# Patient Record
Sex: FEMALE | Race: OTHER | NOT HISPANIC OR LATINO
[De-identification: names, ages, dates, MRNs, and addresses within clinical notes are randomized per-mention and may not be internally consistent; named-entity substitution may affect disease eponyms.]

---

## 2020-05-09 ENCOUNTER — TRANSCRIPTION ENCOUNTER (OUTPATIENT)
Age: 50
End: 2020-05-09

## 2021-05-20 ENCOUNTER — RESULT REVIEW (OUTPATIENT)
Age: 51
End: 2021-05-20

## 2021-06-01 ENCOUNTER — RESULT REVIEW (OUTPATIENT)
Age: 51
End: 2021-06-01

## 2021-06-03 ENCOUNTER — RESULT REVIEW (OUTPATIENT)
Age: 51
End: 2021-06-03

## 2021-06-27 ENCOUNTER — EMERGENCY (EMERGENCY)
Facility: HOSPITAL | Age: 51
LOS: 0 days | Discharge: HOME | End: 2021-06-27
Attending: EMERGENCY MEDICINE | Admitting: EMERGENCY MEDICINE
Payer: COMMERCIAL

## 2021-06-27 VITALS
DIASTOLIC BLOOD PRESSURE: 66 MMHG | SYSTOLIC BLOOD PRESSURE: 126 MMHG | HEART RATE: 68 BPM | RESPIRATION RATE: 18 BRPM | OXYGEN SATURATION: 100 %

## 2021-06-27 VITALS
OXYGEN SATURATION: 98 % | HEART RATE: 54 BPM | SYSTOLIC BLOOD PRESSURE: 144 MMHG | DIASTOLIC BLOOD PRESSURE: 72 MMHG | WEIGHT: 139.99 LBS | RESPIRATION RATE: 18 BRPM | TEMPERATURE: 97 F | HEIGHT: 64 IN

## 2021-06-27 DIAGNOSIS — R42 DIZZINESS AND GIDDINESS: ICD-10-CM
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
## 2021-06-27 DIAGNOSIS — R19.7 DIARRHEA, UNSPECIFIED: ICD-10-CM
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
## 2021-06-27 DIAGNOSIS — Z85.3 PERSONAL HISTORY OF MALIGNANT NEOPLASM OF BREAST: ICD-10-CM

## 2021-06-27 DIAGNOSIS — T67.5XXA HEAT EXHAUSTION, UNSPECIFIED, INITIAL ENCOUNTER: ICD-10-CM

## 2021-06-27 DIAGNOSIS — R11.0 NAUSEA: ICD-10-CM

## 2021-06-27 DIAGNOSIS — R53.1 WEAKNESS: ICD-10-CM

## 2021-06-27 LAB
ALBUMIN SERPL ELPH-MCNC: 4.4 G/DL — SIGNIFICANT CHANGE UP (ref 3.5–5.2)
ALP SERPL-CCNC: 44 U/L — SIGNIFICANT CHANGE UP (ref 30–115)
ALT FLD-CCNC: 8 U/L — SIGNIFICANT CHANGE UP (ref 0–41)
ANION GAP SERPL CALC-SCNC: 9 MMOL/L — SIGNIFICANT CHANGE UP (ref 7–14)
AST SERPL-CCNC: 13 U/L — SIGNIFICANT CHANGE UP (ref 0–41)
BASOPHILS # BLD AUTO: 0.06 K/UL — SIGNIFICANT CHANGE UP (ref 0–0.2)
BASOPHILS NFR BLD AUTO: 0.5 % — SIGNIFICANT CHANGE UP (ref 0–1)
BILIRUB DIRECT SERPL-MCNC: <0.2 MG/DL — SIGNIFICANT CHANGE UP (ref 0–0.2)
BILIRUB INDIRECT FLD-MCNC: >0.1 MG/DL — LOW (ref 0.2–1.2)
BILIRUB SERPL-MCNC: 0.3 MG/DL — SIGNIFICANT CHANGE UP (ref 0.2–1.2)
BUN SERPL-MCNC: 13 MG/DL — SIGNIFICANT CHANGE UP (ref 10–20)
CALCIUM SERPL-MCNC: 9.8 MG/DL — SIGNIFICANT CHANGE UP (ref 8.5–10.1)
CHLORIDE SERPL-SCNC: 102 MMOL/L — SIGNIFICANT CHANGE UP (ref 98–110)
CO2 SERPL-SCNC: 24 MMOL/L — SIGNIFICANT CHANGE UP (ref 17–32)
CREAT SERPL-MCNC: 0.8 MG/DL — SIGNIFICANT CHANGE UP (ref 0.7–1.5)
EOSINOPHIL # BLD AUTO: 0.1 K/UL — SIGNIFICANT CHANGE UP (ref 0–0.7)
EOSINOPHIL NFR BLD AUTO: 0.9 % — SIGNIFICANT CHANGE UP (ref 0–8)
GLUCOSE SERPL-MCNC: 159 MG/DL — HIGH (ref 70–99)
HCG SERPL QL: NEGATIVE — SIGNIFICANT CHANGE UP
HCT VFR BLD CALC: 38.3 % — SIGNIFICANT CHANGE UP (ref 37–47)
HGB BLD-MCNC: 13.3 G/DL — SIGNIFICANT CHANGE UP (ref 12–16)
IMM GRANULOCYTES NFR BLD AUTO: 0.2 % — SIGNIFICANT CHANGE UP (ref 0.1–0.3)
LIDOCAIN IGE QN: 43 U/L — SIGNIFICANT CHANGE UP (ref 7–60)
LYMPHOCYTES # BLD AUTO: 1.27 K/UL — SIGNIFICANT CHANGE UP (ref 1.2–3.4)
LYMPHOCYTES # BLD AUTO: 11.1 % — LOW (ref 20.5–51.1)
MAGNESIUM SERPL-MCNC: 2 MG/DL — SIGNIFICANT CHANGE UP (ref 1.8–2.4)
MCHC RBC-ENTMCNC: 30.4 PG — SIGNIFICANT CHANGE UP (ref 27–31)
MCHC RBC-ENTMCNC: 34.7 G/DL — SIGNIFICANT CHANGE UP (ref 32–37)
MCV RBC AUTO: 87.6 FL — SIGNIFICANT CHANGE UP (ref 81–99)
MONOCYTES # BLD AUTO: 0.63 K/UL — HIGH (ref 0.1–0.6)
MONOCYTES NFR BLD AUTO: 5.5 % — SIGNIFICANT CHANGE UP (ref 1.7–9.3)
NEUTROPHILS # BLD AUTO: 9.4 K/UL — HIGH (ref 1.4–6.5)
NEUTROPHILS NFR BLD AUTO: 81.8 % — HIGH (ref 42.2–75.2)
NRBC # BLD: 0 /100 WBCS — SIGNIFICANT CHANGE UP (ref 0–0)
PLATELET # BLD AUTO: 269 K/UL — SIGNIFICANT CHANGE UP (ref 130–400)
POTASSIUM SERPL-MCNC: 3.9 MMOL/L — SIGNIFICANT CHANGE UP (ref 3.5–5)
POTASSIUM SERPL-SCNC: 3.9 MMOL/L — SIGNIFICANT CHANGE UP (ref 3.5–5)
PROT SERPL-MCNC: 6.7 G/DL — SIGNIFICANT CHANGE UP (ref 6–8)
RBC # BLD: 4.37 M/UL — SIGNIFICANT CHANGE UP (ref 4.2–5.4)
RBC # FLD: 11.9 % — SIGNIFICANT CHANGE UP (ref 11.5–14.5)
SODIUM SERPL-SCNC: 135 MMOL/L — SIGNIFICANT CHANGE UP (ref 135–146)
WBC # BLD: 11.48 K/UL — HIGH (ref 4.8–10.8)
WBC # FLD AUTO: 11.48 K/UL — HIGH (ref 4.8–10.8)

## 2021-06-27 PROCEDURE — 93010 ELECTROCARDIOGRAM REPORT: CPT | Mod: NC

## 2021-06-27 PROCEDURE — 99284 EMERGENCY DEPT VISIT MOD MDM: CPT

## 2021-06-27 RX ORDER — ONDANSETRON 8 MG/1
1 TABLET, FILM COATED ORAL
Qty: 9 | Refills: 0
Start: 2021-06-27 | End: 2021-06-29

## 2021-06-27 RX ORDER — SODIUM CHLORIDE 9 MG/ML
2000 INJECTION, SOLUTION INTRAVENOUS ONCE
Refills: 0 | Status: COMPLETED | OUTPATIENT
Start: 2021-06-27 | End: 2021-06-27

## 2021-06-27 RX ORDER — METOCLOPRAMIDE HCL 10 MG
10 TABLET ORAL ONCE
Refills: 0 | Status: COMPLETED | OUTPATIENT
Start: 2021-06-27 | End: 2021-06-27

## 2021-06-27 RX ADMIN — Medication 104 MILLIGRAM(S): at 20:16

## 2021-06-27 RX ADMIN — SODIUM CHLORIDE 2000 MILLILITER(S): 9 INJECTION, SOLUTION INTRAVENOUS at 20:17

## 2021-06-27 RX ADMIN — Medication 10 MILLIGRAM(S): at 20:40

## 2021-06-27 RX ADMIN — SODIUM CHLORIDE 2000 MILLILITER(S): 9 INJECTION, SOLUTION INTRAVENOUS at 21:00

## 2021-06-27 NOTE — ED PROVIDER NOTE - OBJECTIVE STATEMENT
52 yo F with recent diagnosis of breast ca (pending surg) presents to the ED c/o lightheadedness, nausea, dry heaving and 3 episodes of diarrhea. Pt was outside all day for daughters lacrosse game. Pt was drinking water throughout the day and did have protein bar there. On the way home they stopped at [email or web address] and pt had cheeseburger and mcflurry. Soon after she began to feel lightheaded, nauseous, was dry heaving and had 3 episodes of diarrhea. Pt states overall she feels better now but is still having intermittent waves of lightheadedness/nausea. She denies other complaints. Pt denies fever, chills, abdominal pain, headache, dizziness, weakness, chest pain, SOB, back pain, LOC, trauma, urinary symptoms, cough, calf pain/swelling, recent travel, recent surgery.

## 2021-06-27 NOTE — ED PROVIDER NOTE - PATIENT PORTAL LINK FT
You can access the FollowMyHealth Patient Portal offered by [facility] by registering at the following website: [email or web address]. By joining [employer]’s FollowMyHealth portal, you will also be able to view your health information using other applications (apps) compatible with our system.

## 2021-06-27 NOTE — ED PROVIDER NOTE - NSFOLLOWUPINSTRUCTIONS_ED_ALL_ED_FT
51F p/w nausea, diarrhea, and generalized weakness. Was exposed to hot weather all day and symptoms for n/d started when after eating fast food.  Vitals reviewed by me noted to be wnl.   Physical-nad,perrl,dry mucus membranes, rrr,ctab,abd softntnd,fromx4,anox3   Started on fluids/reglan  pending labs 51F p/w nausea, diarrhea, and generalized weakness. Was exposed to hot weather all day and symptoms for n/d started when after eating fast food.  Vitals reviewed by me noted to be wnl.   Physical-nad,perrl,dry mucus membranes, rrr,ctab,abd softntnd,fromx4,anox3   Started on fluids/reglan  ECG reviewed by me, no ST-T wave segment changes, no stemi noted, no arrhythmias.   pending labs Gastroenteritis, or stomach flu, is an infection of the stomach and intestines. The infection can cause abdominal pain, vomit, flatuence and/or diarrhea. It is very important to make sure that you are feeding and drinking adequately. Please go to your doctor or the emergency room if: you see blood in your diarrhea, cannot stop vomiting, have not urinated for 12 hours or feel like you are going to faint.    Heat exhaustion happens when your body gets too hot (overheated) from hot weather or from exercise. Untreated heat exhaustion could lead to heat stroke. Heat stroke can be deadly.  How can heat exhaustion affect me?  Early warning signs of heat exhaustion are:  Weakness.Fatigue.Stomach cramps.Arm pain.Leg cramps.Later symptoms of heat exhaustion include:  Heavy sweating.Clammy skin.Rapid, weak pulse.Nausea or vomiting.Dizziness.Headache.Fainting.If you have signs and symptoms of heat exhaustion, move to a cool place, loosen your clothing, and drink water or a sports drink. Then, put cool, wet compresses on your body or get into a cool bath or shower.  What can increase my risk?  People who work or exercise outside in hot weather have the highest risk of heat exhaustion. You may also be at higher risk if you:  Are over age 65. Older adults have a greater risk for heat exhaustion than younger adults.Are overweight.Have high blood pressure.Have heart disease.What actions can I take to prevent heat exhaustion?      Avoid being outside on very hot days. Check your local news for extreme heat alerts or warnings.In extreme heat, stay in an air-conditioned environment until the temperature cools off.Check with your health care provider before starting any new exercise or activity. Ask about any health conditions or medicines that might increase your risk for heat exhaustion.Wear lightweight, light-colored, and loose-fitting clothing in warm weather.Do outdoor activities when it is cooler. This may be in the morning, late afternoon, or evening. Take breaks in the shade.Do not work or exercise in the heat when you feel unwell or have been sick.Start any new work or exercise activity gradually.Protect yourself from the sun by wearing a broad-brimmed hat and using at least SPF 15 broad-spectrum sunscreen.Drink enough water or sports drink to keep your urine pale yellow. When it is hot, drink every 15 to 20 minutes, even if you are not thirsty.Do not go out in the heat after a heavy meal. Do not drink alcohol or caffeinated drinks when it is very hot outside.If you have friends or family members who are older adults:  Do not leave an older adult alone in a hot car.Make sure older adults have access to air-conditioning on very hot days. Remind them to drink enough fluids. Check on them at least twice a day if you can.Where to find more information  Centers for Disease Control and Prevention (CDC): https://www.cdc.gov/disasters/extremeheat/warning.htmlMount Sinai Hospitalan Academy of Family Physicians (AAFP): https://familydoctor.org/condition/heat-exhaustion-heatstrokeAmerican Academy of Orthopaedic Surgeons (AAOS): https://orthoinfo.aaos.org/en/diseases--conditions/heat-injury-and-heat-exhaustionContact a health care provider if:  You faint.You feel weak or dizzy.You have any signs or symptoms of heat exhaustion that last more than one hour.Get help right away if you have signs of heat stroke:  Body temperature of 103°F (39.4°C) or higher.Hot, dry, red skin.Fast, thumping pulse.Confusion.Loss of consciousness.Summary  Heat exhaustion happens when your body gets overheated and cannot cool down.Avoid being outside on very hot days. Check your local news for extreme heat alerts or warnings. When you are out in the heat, take steps to protect yourself from the sun and stay hydrated.If you have signs or symptoms of heat exhaustion, get out of the heat, drink fluids, take steps to cool down, and contact a health care provider.Get help right away if you have signs or symptoms of heat stroke.This information is not intended to replace advice given to you by your health care provider. Make sure you discuss any questions you have with your health care provider.

## 2021-06-27 NOTE — ED PROVIDER NOTE - CARE PLAN
Principal Discharge DX:	Diarrhea  Secondary Diagnosis:	Nausea  Secondary Diagnosis:	Heat exhaustion

## 2021-06-27 NOTE — ED ADULT NURSE NOTE - OBJECTIVE STATEMENT
pt states she was out all day in the heat at a sports event and developed a headache, nausea and diarrhea

## 2021-06-27 NOTE — ED PROVIDER NOTE - PHYSICAL EXAMINATION
VITAL SIGNS: I have reviewed nursing notes and confirm.  CONSTITUTIONAL: Well-developed; well-nourished; in no acute distress.  SKIN: Skin exam is warm and dry, no acute rash.  HEAD: Normocephalic; atraumatic.  EYES: conjunctiva and sclera clear.  ENT: No nasal discharge; airway clear.   CARD: S1, S2 normal; no murmurs, gallops, or rubs. Regular rate and rhythm.  RESP: No wheezes, rales or rhonchi. Speaking in full sentences.   ABD: Normal bowel sounds; soft; non-distended; non-tender; No rebound or guarding. No CVA tenderness.  EXT: Normal ROM. No clubbing, cyanosis or edema.  NEURO: Alert, oriented. Grossly unremarkable. No focal deficits.

## 2021-06-27 NOTE — ED PROVIDER NOTE - NS ED ROS FT
Review of Systems  Constitutional:  No fever, chills.  Eyes:  No visual changes, eye pain, or discharge.  ENMT:  No hearing changes, pain, or discharge. No nasal congestion, discharge, or bleeding. No throat pain, swelling, or difficulty swallowing.  Cardiac:  No chest pain, palpitations, syncope, or edema.  Respiratory:  No dyspnea, cough. No hemoptysis.  GI:  No vomiting, or abdominal pain. (+) nausea, diarrhea  :  No dysuria, hematuria, frequency, or burning.   MS:  No back pain.  Skin:  No skin rash, pruritis, jaundice, or lesions.  Neuro:  No headache, dizziness, loss of sensation, or focal weakness.  No change in mental status.   Endocrine: No history of thyroid disease or diabetes.

## 2021-06-27 NOTE — ED PROVIDER NOTE - CLINICAL SUMMARY MEDICAL DECISION MAKING FREE TEXT BOX
51F p/w nausea, diarrhea, and generalized weakness. Was exposed to hot weather all day and symptoms for n/d started when after eating fast food.  Vitals reviewed by me noted to be wnl.   Physical-nad,perrl,dry mucus membranes, rrr,ctab,abd softntnd,fromx4,anox3   Started on fluids/reglan  ECG reviewed by me, no ST-T wave segment changes, no stemi noted, no arrhythmias.   Labs reviewed by me, values noted to be within normal limits.   On reeval- resolution nof nausea tolerating po   DC home with Zofran sent to rx.

## 2021-06-28 PROBLEM — Z00.00 ENCOUNTER FOR PREVENTIVE HEALTH EXAMINATION: Status: ACTIVE | Noted: 2021-06-28

## 2021-06-30 PROBLEM — Z00.00 ENCOUNTER FOR PREVENTIVE HEALTH EXAMINATION: Noted: 2021-06-30

## 2021-07-01 ENCOUNTER — APPOINTMENT (OUTPATIENT)
Dept: CT IMAGING | Facility: CLINIC | Age: 51
End: 2021-07-01
Payer: COMMERCIAL

## 2021-07-01 ENCOUNTER — APPOINTMENT (OUTPATIENT)
Dept: CT IMAGING | Facility: CLINIC | Age: 51
End: 2021-07-01

## 2021-07-01 PROCEDURE — 74174 CTA ABD&PLVS W/CONTRAST: CPT

## 2021-11-17 ENCOUNTER — OUTPATIENT (OUTPATIENT)
Dept: OUTPATIENT SERVICES | Facility: HOSPITAL | Age: 51
LOS: 1 days | End: 2021-11-17
Payer: COMMERCIAL

## 2021-11-17 VITALS
WEIGHT: 149.91 LBS | HEIGHT: 64 IN | DIASTOLIC BLOOD PRESSURE: 79 MMHG | RESPIRATION RATE: 16 BRPM | TEMPERATURE: 98 F | OXYGEN SATURATION: 100 % | SYSTOLIC BLOOD PRESSURE: 125 MMHG | HEART RATE: 63 BPM

## 2021-11-17 DIAGNOSIS — Z98.890 OTHER SPECIFIED POSTPROCEDURAL STATES: Chronic | ICD-10-CM

## 2021-11-17 DIAGNOSIS — Z90.13 ACQUIRED ABSENCE OF BILATERAL BREASTS AND NIPPLES: Chronic | ICD-10-CM

## 2021-11-17 DIAGNOSIS — Z85.3 PERSONAL HISTORY OF MALIGNANT NEOPLASM OF BREAST: ICD-10-CM

## 2021-11-17 DIAGNOSIS — Z98.891 HISTORY OF UTERINE SCAR FROM PREVIOUS SURGERY: Chronic | ICD-10-CM

## 2021-11-17 DIAGNOSIS — Z90.13 ACQUIRED ABSENCE OF BILATERAL BREASTS AND NIPPLES: ICD-10-CM

## 2021-11-17 LAB
ANION GAP SERPL CALC-SCNC: 3 MMOL/L — LOW (ref 5–17)
APPEARANCE UR: CLEAR — SIGNIFICANT CHANGE UP
BASOPHILS # BLD AUTO: 0.04 K/UL — SIGNIFICANT CHANGE UP (ref 0–0.2)
BASOPHILS NFR BLD AUTO: 0.5 % — SIGNIFICANT CHANGE UP (ref 0–2)
BILIRUB UR-MCNC: NEGATIVE — SIGNIFICANT CHANGE UP
BUN SERPL-MCNC: 10 MG/DL — SIGNIFICANT CHANGE UP (ref 7–23)
CALCIUM SERPL-MCNC: 9.2 MG/DL — SIGNIFICANT CHANGE UP (ref 8.5–10.1)
CHLORIDE SERPL-SCNC: 106 MMOL/L — SIGNIFICANT CHANGE UP (ref 96–108)
CO2 SERPL-SCNC: 28 MMOL/L — SIGNIFICANT CHANGE UP (ref 22–31)
COLOR SPEC: YELLOW — SIGNIFICANT CHANGE UP
CREAT SERPL-MCNC: 0.78 MG/DL — SIGNIFICANT CHANGE UP (ref 0.5–1.3)
DIFF PNL FLD: ABNORMAL
EOSINOPHIL # BLD AUTO: 0.12 K/UL — SIGNIFICANT CHANGE UP (ref 0–0.5)
EOSINOPHIL NFR BLD AUTO: 1.5 % — SIGNIFICANT CHANGE UP (ref 0–6)
GLUCOSE SERPL-MCNC: 90 MG/DL — SIGNIFICANT CHANGE UP (ref 70–99)
GLUCOSE UR QL: NEGATIVE MG/DL — SIGNIFICANT CHANGE UP
HCT VFR BLD CALC: 38.6 % — SIGNIFICANT CHANGE UP (ref 34.5–45)
HGB BLD-MCNC: 12.4 G/DL — SIGNIFICANT CHANGE UP (ref 11.5–15.5)
IMM GRANULOCYTES NFR BLD AUTO: 0.2 % — SIGNIFICANT CHANGE UP (ref 0–1.5)
KETONES UR-MCNC: NEGATIVE — SIGNIFICANT CHANGE UP
LEUKOCYTE ESTERASE UR-ACNC: NEGATIVE — SIGNIFICANT CHANGE UP
LYMPHOCYTES # BLD AUTO: 1.98 K/UL — SIGNIFICANT CHANGE UP (ref 1–3.3)
LYMPHOCYTES # BLD AUTO: 23.9 % — SIGNIFICANT CHANGE UP (ref 13–44)
MCHC RBC-ENTMCNC: 26.3 PG — LOW (ref 27–34)
MCHC RBC-ENTMCNC: 32.1 GM/DL — SIGNIFICANT CHANGE UP (ref 32–36)
MCV RBC AUTO: 81.8 FL — SIGNIFICANT CHANGE UP (ref 80–100)
MONOCYTES # BLD AUTO: 0.6 K/UL — SIGNIFICANT CHANGE UP (ref 0–0.9)
MONOCYTES NFR BLD AUTO: 7.3 % — SIGNIFICANT CHANGE UP (ref 2–14)
NEUTROPHILS # BLD AUTO: 5.51 K/UL — SIGNIFICANT CHANGE UP (ref 1.8–7.4)
NEUTROPHILS NFR BLD AUTO: 66.6 % — SIGNIFICANT CHANGE UP (ref 43–77)
NITRITE UR-MCNC: NEGATIVE — SIGNIFICANT CHANGE UP
PH UR: 5 — SIGNIFICANT CHANGE UP (ref 5–8)
PLATELET # BLD AUTO: 321 K/UL — SIGNIFICANT CHANGE UP (ref 150–400)
POTASSIUM SERPL-MCNC: 4.8 MMOL/L — SIGNIFICANT CHANGE UP (ref 3.5–5.3)
POTASSIUM SERPL-SCNC: 4.8 MMOL/L — SIGNIFICANT CHANGE UP (ref 3.5–5.3)
PROT UR-MCNC: NEGATIVE MG/DL — SIGNIFICANT CHANGE UP
RBC # BLD: 4.72 M/UL — SIGNIFICANT CHANGE UP (ref 3.8–5.2)
RBC # FLD: 14.6 % — HIGH (ref 10.3–14.5)
SODIUM SERPL-SCNC: 137 MMOL/L — SIGNIFICANT CHANGE UP (ref 135–145)
SP GR SPEC: 1.01 — SIGNIFICANT CHANGE UP (ref 1.01–1.02)
UROBILINOGEN FLD QL: NEGATIVE MG/DL — SIGNIFICANT CHANGE UP
WBC # BLD: 8.27 K/UL — SIGNIFICANT CHANGE UP (ref 3.8–10.5)
WBC # FLD AUTO: 8.27 K/UL — SIGNIFICANT CHANGE UP (ref 3.8–10.5)

## 2021-11-17 PROCEDURE — 80048 BASIC METABOLIC PNL TOTAL CA: CPT

## 2021-11-17 PROCEDURE — 93010 ELECTROCARDIOGRAM REPORT: CPT

## 2021-11-17 PROCEDURE — 93005 ELECTROCARDIOGRAM TRACING: CPT

## 2021-11-17 PROCEDURE — 87640 STAPH A DNA AMP PROBE: CPT

## 2021-11-17 PROCEDURE — 36415 COLL VENOUS BLD VENIPUNCTURE: CPT

## 2021-11-17 PROCEDURE — 81001 URINALYSIS AUTO W/SCOPE: CPT

## 2021-11-17 PROCEDURE — 85025 COMPLETE CBC W/AUTO DIFF WBC: CPT

## 2021-11-17 PROCEDURE — 87641 MR-STAPH DNA AMP PROBE: CPT

## 2021-11-17 NOTE — H&P PST ADULT - NSICDXFAMILYHX_GEN_ALL_CORE_FT
FAMILY HISTORY:  Father  Still living? Yes, Estimated age: 81-90  Family history of prostate cancer in father, Age at diagnosis: Age Unknown    Mother  Still living? Yes, Estimated age: 81-90  Family history of breast cancer in mother, Age at diagnosis: Age Unknown  Family history of thyroid cancer, Age at diagnosis: Age Unknown

## 2021-11-17 NOTE — H&P PST ADULT - NSICDXPASTMEDICALHX_GEN_ALL_CORE_FT
PAST MEDICAL HISTORY:  Personal history of breast cancer left. b/l mastectomy 7/2021. Wound to left breast. Daily packing.

## 2021-11-17 NOTE — H&P PST ADULT - HISTORY OF PRESENT ILLNESS
51 years old female with personal history of left breast cancer. She had bilateral mastectomy with expander and DAVE Flap 7/2021. Left incision not completely healed. Daily packing of wound by patient. Reports discomfort to bilateral breasts. Planned tissue expander exchange, fat grafting and reconstruction to breast and abdomen.

## 2021-11-17 NOTE — H&P PST ADULT - HEALTH CARE MAINTENANCE
Denies travel outside of state or country in the last 14 days.   Denies contact with known Coronavirus positive person.  Denies fever, chills, cough, congestion, runny nose, SOB or difficulty breathing, fatigue, muscle or body ache, headache. loss of taste or smell, N/V/D.  Denies influenza vaccine

## 2021-11-17 NOTE — H&P PST ADULT - NSICDXPASTSURGICALHX_GEN_ALL_CORE_FT
PAST SURGICAL HISTORY:  History of bilateral breast reduction surgery     History of bilateral mastectomy with DAVE and expanders 2021    History of  delivery ,     History of dilatation and curettage [place] [id number]

## 2021-11-17 NOTE — H&P PST ADULT - NSANTHOSAYNRD_GEN_A_CORE
No. SALOME screening performed.  STOP BANG Legend: 0-2 = LOW Risk; 3-4 = INTERMEDIATE Risk; 5-8 = HIGH Risk

## 2021-11-18 DIAGNOSIS — Z90.13 ACQUIRED ABSENCE OF BILATERAL BREASTS AND NIPPLES: ICD-10-CM

## 2021-11-18 DIAGNOSIS — Z85.3 PERSONAL HISTORY OF MALIGNANT NEOPLASM OF BREAST: ICD-10-CM

## 2021-11-18 LAB
MRSA PCR RESULT.: SIGNIFICANT CHANGE UP
S AUREUS DNA NOSE QL NAA+PROBE: SIGNIFICANT CHANGE UP

## 2021-11-21 ENCOUNTER — APPOINTMENT (OUTPATIENT)
Dept: DISASTER EMERGENCY | Facility: CLINIC | Age: 51
End: 2021-11-21

## 2021-11-21 DIAGNOSIS — Z01.818 ENCOUNTER FOR OTHER PREPROCEDURAL EXAMINATION: ICD-10-CM

## 2021-11-22 LAB — SARS-COV-2 N GENE NPH QL NAA+PROBE: NOT DETECTED

## 2021-11-24 ENCOUNTER — RESULT REVIEW (OUTPATIENT)
Age: 51
End: 2021-11-24

## 2021-11-24 ENCOUNTER — OUTPATIENT (OUTPATIENT)
Dept: INPATIENT UNIT | Facility: HOSPITAL | Age: 51
LOS: 1 days | Discharge: HOME | End: 2021-11-24
Payer: COMMERCIAL

## 2021-11-24 VITALS
SYSTOLIC BLOOD PRESSURE: 117 MMHG | DIASTOLIC BLOOD PRESSURE: 61 MMHG | RESPIRATION RATE: 16 BRPM | HEART RATE: 73 BPM | TEMPERATURE: 97 F | OXYGEN SATURATION: 100 %

## 2021-11-24 VITALS
HEIGHT: 64 IN | WEIGHT: 149.91 LBS | RESPIRATION RATE: 16 BRPM | SYSTOLIC BLOOD PRESSURE: 131 MMHG | HEART RATE: 69 BPM | DIASTOLIC BLOOD PRESSURE: 85 MMHG | TEMPERATURE: 98 F | OXYGEN SATURATION: 100 %

## 2021-11-24 DIAGNOSIS — Z90.13 ACQUIRED ABSENCE OF BILATERAL BREASTS AND NIPPLES: ICD-10-CM

## 2021-11-24 DIAGNOSIS — Z98.890 OTHER SPECIFIED POSTPROCEDURAL STATES: Chronic | ICD-10-CM

## 2021-11-24 DIAGNOSIS — M95.8 OTHER SPECIFIED ACQUIRED DEFORMITIES OF MUSCULOSKELETAL SYSTEM: ICD-10-CM

## 2021-11-24 DIAGNOSIS — N61.0 MASTITIS WITHOUT ABSCESS: ICD-10-CM

## 2021-11-24 DIAGNOSIS — N60.31 FIBROSCLEROSIS OF RIGHT BREAST: ICD-10-CM

## 2021-11-24 DIAGNOSIS — N65.0 DEFORMITY OF RECONSTRUCTED BREAST: ICD-10-CM

## 2021-11-24 DIAGNOSIS — N64.1 FAT NECROSIS OF BREAST: ICD-10-CM

## 2021-11-24 DIAGNOSIS — N64.4 MASTODYNIA: ICD-10-CM

## 2021-11-24 DIAGNOSIS — N61.1 ABSCESS OF THE BREAST AND NIPPLE: ICD-10-CM
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
## 2021-11-24 DIAGNOSIS — L90.5 SCAR CONDITIONS AND FIBROSIS OF SKIN: ICD-10-CM

## 2021-11-24 DIAGNOSIS — Z98.891 HISTORY OF UTERINE SCAR FROM PREVIOUS SURGERY: Chronic | ICD-10-CM

## 2021-11-24 DIAGNOSIS — Z85.3 PERSONAL HISTORY OF MALIGNANT NEOPLASM OF BREAST: ICD-10-CM

## 2021-11-24 DIAGNOSIS — Z98.82 BREAST IMPLANT STATUS: ICD-10-CM

## 2021-11-24 DIAGNOSIS — S21.002D UNSPECIFIED OPEN WOUND OF LEFT BREAST, SUBSEQUENT ENCOUNTER: ICD-10-CM

## 2021-11-24 DIAGNOSIS — Z98.890 OTHER SPECIFIED POSTPROCEDURAL STATES: ICD-10-CM

## 2021-11-24 DIAGNOSIS — Z80.3 FAMILY HISTORY OF MALIGNANT NEOPLASM OF BREAST: ICD-10-CM

## 2021-11-24 DIAGNOSIS — Z85.850 PERSONAL HISTORY OF MALIGNANT NEOPLASM OF THYROID: ICD-10-CM

## 2021-11-24 DIAGNOSIS — Z90.13 ACQUIRED ABSENCE OF BILATERAL BREASTS AND NIPPLES: Chronic | ICD-10-CM

## 2021-11-24 LAB — HCG UR QL: NEGATIVE — SIGNIFICANT CHANGE UP

## 2021-11-24 PROCEDURE — 88300 SURGICAL PATH GROSS: CPT

## 2021-11-24 PROCEDURE — 88304 TISSUE EXAM BY PATHOLOGIST: CPT | Mod: 26

## 2021-11-24 PROCEDURE — 99261: CPT

## 2021-11-24 PROCEDURE — 81025 URINE PREGNANCY TEST: CPT

## 2021-11-24 PROCEDURE — 88305 TISSUE EXAM BY PATHOLOGIST: CPT

## 2021-11-24 PROCEDURE — 88304 TISSUE EXAM BY PATHOLOGIST: CPT

## 2021-11-24 PROCEDURE — C1789: CPT

## 2021-11-24 PROCEDURE — 88300 SURGICAL PATH GROSS: CPT | Mod: 26,59

## 2021-11-24 PROCEDURE — 88305 TISSUE EXAM BY PATHOLOGIST: CPT | Mod: 26

## 2021-11-24 RX ORDER — ONDANSETRON 8 MG/1
4 TABLET, FILM COATED ORAL ONCE
Refills: 0 | Status: COMPLETED | OUTPATIENT
Start: 2021-11-24 | End: 2021-11-24

## 2021-11-24 RX ORDER — FENTANYL CITRATE 50 UG/ML
50 INJECTION INTRAVENOUS
Refills: 0 | Status: DISCONTINUED | OUTPATIENT
Start: 2021-11-24 | End: 2021-11-24

## 2021-11-24 RX ORDER — PROCHLORPERAZINE MALEATE 5 MG
5 TABLET ORAL ONCE
Refills: 0 | Status: COMPLETED | OUTPATIENT
Start: 2021-11-24 | End: 2021-11-24

## 2021-11-24 RX ORDER — SODIUM CHLORIDE 9 MG/ML
1000 INJECTION, SOLUTION INTRAVENOUS
Refills: 0 | Status: DISCONTINUED | OUTPATIENT
Start: 2021-11-24 | End: 2021-11-24

## 2021-11-24 RX ORDER — OXYCODONE HYDROCHLORIDE 5 MG/1
10 TABLET ORAL ONCE
Refills: 0 | Status: DISCONTINUED | OUTPATIENT
Start: 2021-11-24 | End: 2021-11-24

## 2021-11-24 RX ORDER — SCOPALAMINE 1 MG/3D
1 PATCH, EXTENDED RELEASE TRANSDERMAL
Refills: 0 | Status: DISCONTINUED | OUTPATIENT
Start: 2021-11-24 | End: 2021-11-24

## 2021-11-24 RX ADMIN — Medication 5 MILLIGRAM(S): at 16:55

## 2021-11-24 RX ADMIN — SCOPALAMINE 1 PATCH: 1 PATCH, EXTENDED RELEASE TRANSDERMAL at 11:55

## 2021-11-24 RX ADMIN — ONDANSETRON 4 MILLIGRAM(S): 8 TABLET, FILM COATED ORAL at 16:40

## 2021-11-24 NOTE — ASU DISCHARGE PLAN (ADULT/PEDIATRIC) - PROCEDURE
Bilateral breast Implant exchange, Left breast wound excision; abdominal scar revision, Liposuction of abdomen, flanks and thighs with fat grafting to breasts

## 2021-11-24 NOTE — BRIEF OPERATIVE NOTE - NSICDXBRIEFPROCEDURE_GEN_ALL_CORE_FT
PROCEDURES:  Exploration of breast with replacement of implant 24-Nov-2021 16:17:43 excision of clean wound of Left meail breast Yadira Burnett  Liposuction of abdomen, flank, and thigh 24-Nov-2021 16:19:08 with Fat grafting to breast and revision of abdominal scar revision Yadira Burnett

## 2021-11-24 NOTE — ASU PATIENT PROFILE, ADULT - TEACHING/LEARNING FACTORS IMPACT ABILITY TO LEARN
PATIENT A&OX4, FORGETFUL. ROOM AIR, IV RIGHT FOREARM SALINE LOCK. UP WITH
THERAPY, MAX ASSIST. SAT ON SIDE OF BED TODAY WITH THERAPY. RIGHT AKA, NO C/O
PAIN/N/V. INCREASE IN APPETITE, EATING 50%+ OF MEALS TODAY. VASCULAR TO CHANGE
DRSG TO RIGHT AKA, HAVE NOT SEEN YET. NO OTHER CONCERNS AT THIS TIME.
APPROPRIATE AND COOPORATIVE WITH CARE. none

## 2021-11-24 NOTE — ASU DISCHARGE PLAN (ADULT/PEDIATRIC) - CARE PROVIDER_API CALL
Zach Evans)  Plastic Surgery  [address], [address]  [place] [address]  Phone: [phone]  Fax: [phone]  Follow Up Time:

## 2021-11-24 NOTE — ASU PATIENT PROFILE, ADULT - NSICDXPASTSURGICALHX_GEN_ALL_CORE_FT
PAST SURGICAL HISTORY:  History of bilateral breast reduction surgery     History of bilateral mastectomy with DAVE and expanders 2021    History of  delivery ,     History of dilatation and curettage [place] [address]

## 2022-10-12 PROBLEM — Z85.3 PERSONAL HISTORY OF MALIGNANT NEOPLASM OF BREAST: Chronic | Status: ACTIVE | Noted: 2021-11-17

## 2022-11-07 ENCOUNTER — APPOINTMENT (OUTPATIENT)
Dept: PLASTIC SURGERY | Facility: CLINIC | Age: 52
End: 2022-11-07

## 2022-11-07 VITALS
HEIGHT: 64 IN | WEIGHT: 145 LBS | BODY MASS INDEX: 24.75 KG/M2 | HEART RATE: 56 BPM | DIASTOLIC BLOOD PRESSURE: 84 MMHG | SYSTOLIC BLOOD PRESSURE: 134 MMHG | TEMPERATURE: 98.3 F | OXYGEN SATURATION: 100 %

## 2022-11-07 DIAGNOSIS — N63.14 UNSPECIFIED LUMP IN THE RIGHT BREAST, LOWER INNER QUADRANT: ICD-10-CM

## 2022-11-07 DIAGNOSIS — Z80.3 FAMILY HISTORY OF MALIGNANT NEOPLASM OF BREAST: ICD-10-CM

## 2022-11-07 DIAGNOSIS — Z78.9 OTHER SPECIFIED HEALTH STATUS: ICD-10-CM

## 2022-11-07 DIAGNOSIS — Z80.0 FAMILY HISTORY OF MALIGNANT NEOPLASM OF DIGESTIVE ORGANS: ICD-10-CM

## 2022-11-07 PROCEDURE — 99213 OFFICE O/P EST LOW 20 MIN: CPT

## 2022-11-07 NOTE — PHYSICAL EXAM
[Normocephalic] : normocephalic [Atraumatic] : atraumatic [Supple] : supple [No Supraclavicular Adenopathy] : no supraclavicular adenopathy [Examined in the supine and seated position] : examined in the supine and seated position [No dominant masses] : no dominant masses left breast [No Nipple Retraction] : no left nipple retraction [No Nipple Discharge] : no left nipple discharge [No Axillary Lymphadenopathy] : no left axillary lymphadenopathy [No Edema] : no edema [No Rashes] : no rashes [No Ulceration] : no ulceration [EOMI] : extra ocular movement intact [Sclera nonicteric] : sclera nonicteric [No Cervical Adenopathy] : no cervical adenopathy [No Thyromegaly] : no thyromegaly [de-identified] : S/P N/A sparing bilateral mastectomies with DAVE flaps and implants [de-identified] : 4:00 (N10.5cm) palpable mass measuring 1.2 x 0.8 cm, smooth, mobile with benign features

## 2022-11-07 NOTE — ASSESSMENT
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 Follow up office visit due 2/2023\par 2. Advised monthly self breast examinations and advised her to contact me if she has any concerns.\par 3. Bilateral breast MRI 11/2026 (5 year evaluation after implant placement)\par 4. Advised patient schedule her screening colonoscopy\par 5. Advised targeted right breast ultrasound now

## 2022-11-07 NOTE — CONSULT LETTER
[Dear  ___] : Dear  [unfilled], [Courtesy Letter:] : I had the pleasure of seeing your patient, [unfilled], in my office today. [Please see my note below.] : Please see my note below. [Sincerely,] : Sincerely, [DrSamantha  ___] : Dr. OLGUIN [DrSamantha ___] : Dr. OLGUIN [FreeTextEntry3] : Susan M. Palleschi, MD, FACS\par Division of Breast Surgery\par Director, Breast Surgery\par [facility]\par [address] [place] [address]\par [address]\[place] [address]\par (Phone) [phone]\par (Fax) [phone]

## 2022-11-07 NOTE — HISTORY OF PRESENT ILLNESS
[FreeTextEntry1] : Patient is a 52 year old female here today for a follow up visit. \par She is s/p bilateral breast reduction in 2010 with Dr. Che at [place]. Initial pathology revealed left breast DCIS; slides reviewed at [place] and [place]; pathology revealed atypical ductal hyperplasia. She states that she was not advised to see a medical oncologist at that time.\par She has a history of a right breast fibroadenoma on core needle biopsy in 2014. \par Family history of breast cancer in her mother, diagnosed at age 69. \par 5/20/20 left stereotactic biopsy revealing DCIS\par 6/3/2021 [facility] genetic testing negative. \par Med Onc: she saw Dr. Chris. \par 7/26/2021 s/p bilateral nipple areolar sparing total mastectomies, left axillary sentinel lymph node biopsy with DAVE flap and implant reconstruction, nerve grafts. Pathology revealed left DCIS, high grade, comedo necrosis, separate foci measuring up to 6 mm, margins negative, 3 SLNs negative, ER+/IA-\par Plastics: Dr. Evans. She will see him next week.\par She reports bilateral breast sensitivity\par No change in health history\par Due to see her GYN

## 2022-11-14 ENCOUNTER — RESULT REVIEW (OUTPATIENT)
Age: 52
End: 2022-11-14

## 2022-11-14 ENCOUNTER — APPOINTMENT (OUTPATIENT)
Dept: ULTRASOUND IMAGING | Facility: CLINIC | Age: 52
End: 2022-11-14

## 2022-11-14 PROCEDURE — 76641 ULTRASOUND BREAST COMPLETE: CPT | Mod: RT

## 2022-12-06 ENCOUNTER — NON-APPOINTMENT (OUTPATIENT)
Age: 52
End: 2022-12-06

## 2022-12-19 NOTE — H&P PST ADULT - NEUROLOGICAL
What Type Of Note Output Would You Prefer (Optional)?: Bullet Format How Severe Is Your Skin Lesion?: mild Has Your Skin Lesion Been Treated?: not been treated Is This A New Presentation, Or A Follow-Up?: Skin Lesion Additional History: Patient states pain level of 0/10 negative Alert & oriented; no sensory, motor or coordination deficits, normal reflexes

## 2023-02-07 ENCOUNTER — APPOINTMENT (OUTPATIENT)
Dept: PLASTIC SURGERY | Facility: CLINIC | Age: 53
End: 2023-02-07
Payer: COMMERCIAL

## 2023-02-21 ENCOUNTER — NON-APPOINTMENT (OUTPATIENT)
Age: 53
End: 2023-02-21

## 2023-02-21 ENCOUNTER — APPOINTMENT (OUTPATIENT)
Dept: PLASTIC SURGERY | Facility: CLINIC | Age: 53
End: 2023-02-21
Payer: COMMERCIAL

## 2023-02-24 ENCOUNTER — APPOINTMENT (OUTPATIENT)
Dept: PLASTIC SURGERY | Facility: CLINIC | Age: 53
End: 2023-02-24
Payer: COMMERCIAL

## 2023-02-24 VITALS
TEMPERATURE: 98.2 F | WEIGHT: 140 LBS | HEART RATE: 64 BPM | BODY MASS INDEX: 23.9 KG/M2 | OXYGEN SATURATION: 98 % | SYSTOLIC BLOOD PRESSURE: 128 MMHG | DIASTOLIC BLOOD PRESSURE: 84 MMHG | HEIGHT: 64 IN

## 2023-02-24 PROCEDURE — 99213 OFFICE O/P EST LOW 20 MIN: CPT

## 2023-02-24 NOTE — PHYSICAL EXAM
[de-identified] : S/P N/A sparing bilateral mastectomies with DAVE flaps and implants [de-identified] : 4:00 (N10.5cm) palpable fat necrosis is no longer present. [de-identified] : Lower inner portion of IM fold scar is slightly hypertrophic.

## 2023-02-24 NOTE — CONSULT LETTER
[FreeTextEntry3] : Zari Dobbs, RPA-C\par Breast Surgery\par [address]\par [address]\[place] [address]\par (Phone) [phone]\par (Fax) [phone]

## 2023-02-24 NOTE — HISTORY OF PRESENT ILLNESS
[FreeTextEntry1] : She is s/p bilateral breast reduction in 2010 with Dr. Che at [place]. Initial pathology revealed left breast DCIS; slides reviewed at [place] and [place]; pathology revealed atypical ductal hyperplasia. She states that she was not advised to see a medical oncologist at that time.\par She has a history of a right breast fibroadenoma on core needle biopsy in 2014. \par Family history of breast cancer in her mother, diagnosed at age 69. \par 5/20/20 left stereotactic biopsy revealing DCIS\par 6/3/2021 [facility] genetic testing negative. \par Med Onc: she saw Dr. Chris in the past\par 7/26/2021 s/p bilateral nipple areolar sparing total mastectomies, left axillary sentinel lymph node biopsy with DAVE flap and implant reconstruction, nerve grafts. Pathology revealed left DCIS, high grade, comedo necrosis, separate foci measuring up to 6 mm, margins negative, 3 SLNs negative, ER+/OK-\par 11/14/2022 Right breast ultrasound: 4:00 palpable mass c/w 17 x 7 mm partially cystic and solid ovoid mass. 6:00 additional partially cystic and solid ovoid mass 13 x 4 mm. 12:00 7 mm hypoechoic nodule. Given history, these likely represent multiple areas of fat necrosis. BI-RADS 0, advise right 2-view mammography\par She never underwent the mammography because she underwent an excision of the area of fat necrosis when she had left revision with Dr. Evans 12/2023. She last saw him 1/2023\par She denies any current breast concerns \par Up to date with MDs

## 2023-02-24 NOTE — ASSESSMENT
[FreeTextEntry1] : History of left breast DCIS s/p bilateral mastectomies (7/2021)\par Right palpable fat necrosis\par \par 1. Follow up office visit due 8/2023\par 2. Advised monthly self breast examinations and advised her to contact me if she has any concerns.\par 3. Bilateral breast MRI 11/2026 (5 year evaluation after implant placement)

## 2023-06-22 ENCOUNTER — NON-APPOINTMENT (OUTPATIENT)
Age: 53
End: 2023-06-22

## 2023-08-30 ENCOUNTER — APPOINTMENT (OUTPATIENT)
Dept: PLASTIC SURGERY | Facility: CLINIC | Age: 53
End: 2023-08-30
Payer: COMMERCIAL

## 2023-08-30 VITALS
SYSTOLIC BLOOD PRESSURE: 126 MMHG | DIASTOLIC BLOOD PRESSURE: 80 MMHG | HEART RATE: 62 BPM | OXYGEN SATURATION: 99 % | TEMPERATURE: 98.3 F | WEIGHT: 150 LBS | BODY MASS INDEX: 25.61 KG/M2 | HEIGHT: 64 IN

## 2023-08-30 PROCEDURE — 99213 OFFICE O/P EST LOW 20 MIN: CPT

## 2023-08-30 NOTE — PHYSICAL EXAM
[Normocephalic] : normocephalic [Atraumatic] : atraumatic [EOMI] : extra ocular movement intact [Sclera nonicteric] : sclera nonicteric [Supple] : supple [No Supraclavicular Adenopathy] : no supraclavicular adenopathy [No Cervical Adenopathy] : no cervical adenopathy [No Thyromegaly] : no thyromegaly [Examined in the supine and seated position] : examined in the supine and seated position [No dominant masses] : no dominant masses in right breast  [No dominant masses] : no dominant masses left breast [No Nipple Retraction] : no left nipple retraction [No Nipple Discharge] : no left nipple discharge [No Axillary Lymphadenopathy] : no left axillary lymphadenopathy [No Edema] : no edema [No Rashes] : no rashes [No Ulceration] : no ulceration [de-identified] : S/P N/A sparing bilateral mastectomies with DAVE flaps and implants [de-identified] : Lower inner portion of IM fold scar is slightly hypertrophic.

## 2023-08-30 NOTE — HISTORY OF PRESENT ILLNESS
[FreeTextEntry1] : Patient is a 53 year old female here today for a follow up visit.  She is s/p bilateral breast reduction in 2010 with Dr. Che at [place]. Initial pathology revealed left breast DCIS; slides reviewed at [place] and [place]; pathology revealed atypical ductal hyperplasia. She states that she was not advised to see a medical oncologist at that time. She has a history of a right breast fibroadenoma on core needle biopsy in 2014. Family history of breast cancer in her mother, diagnosed at age 69. 5/20/20 left stereotactic biopsy revealing DCIS 6/3/2021 [facility] genetic testing negative. Med Onc: she saw Dr. Chris in the past 7/26/2021 s/p bilateral nipple areolar sparing total mastectomies, left axillary sentinel lymph node biopsy with DAVE flap and implant reconstruction, nerve grafts. Pathology revealed left DCIS, high grade, comedo necrosis, separate foci measuring up to 6 mm, margins negative, 3 SLNs negative, ER+/SD- 11/14/2022 Right breast ultrasound: 4:00 palpable mass c/w 17 x 7 mm partially cystic and solid ovoid mass. 6:00 additional partially cystic and solid ovoid mass 13 x 4 mm. 12:00 7 mm hypoechoic nodule. Given history, these likely represent multiple areas of fat necrosis. BI-RADS 0, advise right 2-view mammography She never underwent the mammography because she underwent an excision of the area of fat necrosis when she had left revision with the plastic surgeon Dr. Evans 12/2023. She last saw him 12/2023. She denies any breast masses.

## 2023-08-30 NOTE — CONSULT LETTER
[Dear  ___] : Dear  [unfilled], [Courtesy Letter:] : I had the pleasure of seeing your patient, [unfilled], in my office today. [Please see my note below.] : Please see my note below. [Sincerely,] : Sincerely, [DrSamantha  ___] : Dr. OLGUIN [DrSamantha ___] : Dr. OLGUIN [FreeTextEntry3] : Susan M. Palleschi, MD, FACS Division of Breast Surgery Director, Breast Surgery [address] [address] (Phone) [phone] (Fax) [phone]

## 2023-08-30 NOTE — ASSESSMENT
[FreeTextEntry1] : History of left breast DCIS s/p bilateral mastectomies (7/2021) clinical breast exam negative  1. Follow up office visit due 3/2024 2. Advised monthly self breast examinations and advised her to contact me if she has any concerns. 3. Bilateral breast MRI 11/2026 (5 year evaluation after implant placement)

## 2024-03-13 ENCOUNTER — APPOINTMENT (OUTPATIENT)
Dept: PLASTIC SURGERY | Facility: CLINIC | Age: 54
End: 2024-03-13
Payer: COMMERCIAL

## 2024-03-13 VITALS
HEIGHT: 64 IN | TEMPERATURE: 98.2 F | OXYGEN SATURATION: 99 % | SYSTOLIC BLOOD PRESSURE: 130 MMHG | WEIGHT: 150 LBS | DIASTOLIC BLOOD PRESSURE: 81 MMHG | BODY MASS INDEX: 25.61 KG/M2 | RESPIRATION RATE: 16 BRPM | HEART RATE: 72 BPM

## 2024-03-13 DIAGNOSIS — D05.12 INTRADUCTAL CARCINOMA IN SITU OF LEFT BREAST: ICD-10-CM

## 2024-03-13 PROCEDURE — 99213 OFFICE O/P EST LOW 20 MIN: CPT

## 2024-03-13 NOTE — CONSULT LETTER
[Dear  ___] : Dear  [unfilled], [Courtesy Letter:] : I had the pleasure of seeing your patient, [unfilled], in my office today. [Please see my note below.] : Please see my note below. [Sincerely,] : Sincerely, [DrSamantha  ___] : Dr. OLGUIN [DrSamantha ___] : Dr. OLGUIN [FreeTextEntry3] : Zari Dobbs, RPA-C Breast Surgery [address] [place] [address], [place] [address] (Phone) [phone] (Fax) [phone]

## 2024-03-13 NOTE — ASSESSMENT
[FreeTextEntry1] : History of left breast DCIS s/p bilateral mastectomies (7/2021) clinical breast exam negative  1. Follow up office visit due 9/2024 2. Advised monthly self breast examinations and advised her to contact me if she has any concerns. 3. Bilateral breast MRI with IV contrast 11/2026 (5 year evaluation after implant placement)

## 2024-03-13 NOTE — PHYSICAL EXAM
[Normocephalic] : normocephalic [Atraumatic] : atraumatic [EOMI] : extra ocular movement intact [Sclera nonicteric] : sclera nonicteric [Supple] : supple [No Supraclavicular Adenopathy] : no supraclavicular adenopathy [Examined in the supine and seated position] : examined in the supine and seated position [No dominant masses] : no dominant masses in right breast  [No dominant masses] : no dominant masses left breast [No Nipple Retraction] : no left nipple retraction [No Nipple Discharge] : no left nipple discharge [No Axillary Lymphadenopathy] : no left axillary lymphadenopathy [No Edema] : no edema [No Rashes] : no rashes [No Ulceration] : no ulceration [de-identified] : S/P N/A sparing bilateral mastectomies with DAVE flaps and implants [de-identified] : Lower inner portion of IM fold scar is slightly hypertrophic.

## 2024-03-13 NOTE — HISTORY OF PRESENT ILLNESS
[FreeTextEntry1] : Patient is a 53 year old female here today for a follow up visit.  She is s/p bilateral breast reduction in 2010 with Dr. Che at [facility] [facility]. Initial pathology revealed left breast DCIS; slides reviewed at [facility]; pathology revealed atypical ductal hyperplasia. She states that she was not advised to see a medical oncologist at that time. She has a history of a right breast fibroadenoma on core needle biopsy in 2014. Family history of breast cancer in her mother, diagnosed at age 69. 5/20/20 left stereotactic biopsy revealing DCIS 6/3/2021 [facility] genetic testing negative. Med Onc: she saw Dr. Chris in the past 7/26/2021 s/p bilateral nipple areolar sparing total mastectomies, left axillary sentinel lymph node biopsy with DAVE flap and implant reconstruction, nerve grafts. Pathology revealed left DCIS, high grade, comedo necrosis, separate foci measuring up to 6 mm, margins negative, 3 SLNs negative, ER+/OH- 11/14/2022 Right breast ultrasound: 4:00 palpable mass c/w 17 x 7 mm partially cystic and solid ovoid mass. 6:00 additional partially cystic and solid ovoid mass 13 x 4 mm. 12:00 7 mm hypoechoic nodule. Given history, these likely represent multiple areas of fat necrosis. BI-RADS 0, advise right 2-view mammography She never underwent the mammography because she underwent an excision of the area of fat necrosis when she had left revision with the plastic surgeon Dr. Evans 12/2022. She is seeing him later today. Her colonoscopy was normal 10/2023 She denies any breast masses.

## 2024-03-27 ENCOUNTER — NON-APPOINTMENT (OUTPATIENT)
Age: 54
End: 2024-03-27

## 2024-03-27 ENCOUNTER — APPOINTMENT (OUTPATIENT)
Dept: OPHTHALMOLOGY | Facility: CLINIC | Age: 54
End: 2024-03-27
Payer: COMMERCIAL

## 2024-03-27 PROCEDURE — 99203 OFFICE O/P NEW LOW 30 MIN: CPT

## 2024-04-17 ENCOUNTER — OUTPATIENT (OUTPATIENT)
Dept: OUTPATIENT SERVICES | Facility: HOSPITAL | Age: 54
LOS: 1 days | End: 2024-04-17
Payer: COMMERCIAL

## 2024-04-17 ENCOUNTER — APPOINTMENT (OUTPATIENT)
Dept: OPHTHALMOLOGY | Facility: CLINIC | Age: 54
End: 2024-04-17
Payer: COMMERCIAL

## 2024-04-17 VITALS
TEMPERATURE: 98 F | WEIGHT: 143.3 LBS | OXYGEN SATURATION: 100 % | RESPIRATION RATE: 16 BRPM | HEART RATE: 58 BPM | HEIGHT: 64 IN

## 2024-04-17 DIAGNOSIS — Z90.13 ACQUIRED ABSENCE OF BILATERAL BREASTS AND NIPPLES: Chronic | ICD-10-CM

## 2024-04-17 DIAGNOSIS — Z01.818 ENCOUNTER FOR OTHER PREPROCEDURAL EXAMINATION: ICD-10-CM

## 2024-04-17 DIAGNOSIS — Z98.890 OTHER SPECIFIED POSTPROCEDURAL STATES: Chronic | ICD-10-CM

## 2024-04-17 DIAGNOSIS — N65.0 DEFORMITY OF RECONSTRUCTED BREAST: ICD-10-CM

## 2024-04-17 DIAGNOSIS — Z98.891 HISTORY OF UTERINE SCAR FROM PREVIOUS SURGERY: Chronic | ICD-10-CM

## 2024-04-17 DIAGNOSIS — Z90.13 ACQUIRED ABSENCE OF BILATERAL BREASTS AND NIPPLES: ICD-10-CM

## 2024-04-17 DIAGNOSIS — Z85.3 PERSONAL HISTORY OF MALIGNANT NEOPLASM OF BREAST: ICD-10-CM

## 2024-04-17 DIAGNOSIS — H02.833 DERMATOCHALASIS OF RIGHT EYE, UNSPECIFIED EYELID: ICD-10-CM

## 2024-04-17 LAB
HCT VFR BLD CALC: 40.1 % — SIGNIFICANT CHANGE UP (ref 34.5–45)
HGB BLD-MCNC: 14.2 G/DL — SIGNIFICANT CHANGE UP (ref 11.5–15.5)
MCHC RBC-ENTMCNC: 30.4 PG — SIGNIFICANT CHANGE UP (ref 27–34)
MCHC RBC-ENTMCNC: 35.4 GM/DL — SIGNIFICANT CHANGE UP (ref 32–36)
MCV RBC AUTO: 85.9 FL — SIGNIFICANT CHANGE UP (ref 80–100)
NRBC # BLD: 0 /100 WBCS — SIGNIFICANT CHANGE UP (ref 0–0)
PLATELET # BLD AUTO: 290 K/UL — SIGNIFICANT CHANGE UP (ref 150–400)
RBC # BLD: 4.67 M/UL — SIGNIFICANT CHANGE UP (ref 3.8–5.2)
RBC # FLD: 11.9 % — SIGNIFICANT CHANGE UP (ref 10.3–14.5)
WBC # BLD: 6.58 K/UL — SIGNIFICANT CHANGE UP (ref 3.8–10.5)
WBC # FLD AUTO: 6.58 K/UL — SIGNIFICANT CHANGE UP (ref 3.8–10.5)

## 2024-04-17 PROCEDURE — 36415 COLL VENOUS BLD VENIPUNCTURE: CPT

## 2024-04-17 PROCEDURE — G0463: CPT

## 2024-04-17 PROCEDURE — 92083 EXTENDED VISUAL FIELD XM: CPT

## 2024-04-17 PROCEDURE — 85027 COMPLETE CBC AUTOMATED: CPT

## 2024-04-17 NOTE — H&P PST ADULT - NSICDXPASTSURGICALHX_GEN_ALL_CORE_FT
PAST SURGICAL HISTORY:  History of bilateral breast reduction surgery     History of bilateral mastectomy with DAVE and expanders 2021    History of  delivery ,     History of dilatation and curettage [place] [id number]

## 2024-04-17 NOTE — H&P PST ADULT - NSSUBSTANCEUSE_GEN_ALL_CORE_SD
Chart reviewed     Recommend return to medication     Recommend return to small volumn of food.
From: Gordan Hodgkins Russom  To: Carlos Guillermo MD  Sent: 4/21/2023 5:38 AM CDT  Subject: Question     Good morning! I noticed towards before I stopped the acid reducing medicine it seemed as if symptoms were getting better, less gas. At the time I stopped it though I wasn't eating as much as I normally do, so I don't know for sure if it was medicine, or cause I was eating less which gave me less aggravation. The constant gas has returned now, don't know if you still want me to stay off it till appointment or what. I'm at work Fri, sat, Sunday and Monday till 3 pm. Thanks!
Per pt [facility] asking for 90 day supply omeprazole sent to [facility] in [employer] due to insurance. Pt also asking if rice could be causing his sx as he eats that every day? Please advise.
Please see pt mcm and advise, constant gas has returned since stopping omeprazole. Unsure if due to the amount of food consumption has changed.
Pt notified via [employer].
denies/caffeine

## 2024-04-17 NOTE — H&P PST ADULT - HISTORY OF PRESENT ILLNESS
This is a 52 y/o female with PMHX of breast cancer who presents to PST for scheduled left breast revision and upper blepharoplasty on 4/25/24.  s/p double mastectomy in 2021, with hybrid DAVE FLAP.  Otherwise pt feels well today and denies any acute symptoms.

## 2024-04-17 NOTE — H&P PST ADULT - NSANTHOSAYNRD_GEN_A_CORE
No. SALOME screening performed.  STOP BANG Legend: 0-2 = LOW Risk; 3-4 = INTERMEDIATE Risk; 5-8 = HIGH Risk Principal Discharge DX:	Shortness of breath

## 2024-04-24 ENCOUNTER — TRANSCRIPTION ENCOUNTER (OUTPATIENT)
Age: 54
End: 2024-04-24

## 2024-04-25 ENCOUNTER — OUTPATIENT (OUTPATIENT)
Dept: OUTPATIENT SERVICES | Facility: HOSPITAL | Age: 54
LOS: 1 days | End: 2024-04-25
Payer: COMMERCIAL

## 2024-04-25 ENCOUNTER — TRANSCRIPTION ENCOUNTER (OUTPATIENT)
Age: 54
End: 2024-04-25

## 2024-04-25 VITALS
SYSTOLIC BLOOD PRESSURE: 149 MMHG | HEART RATE: 85 BPM | RESPIRATION RATE: 16 BRPM | DIASTOLIC BLOOD PRESSURE: 84 MMHG | TEMPERATURE: 98 F | OXYGEN SATURATION: 95 %

## 2024-04-25 VITALS
TEMPERATURE: 98 F | WEIGHT: 143.3 LBS | OXYGEN SATURATION: 100 % | RESPIRATION RATE: 13 BRPM | DIASTOLIC BLOOD PRESSURE: 79 MMHG | HEIGHT: 64 IN | HEART RATE: 57 BPM | SYSTOLIC BLOOD PRESSURE: 139 MMHG

## 2024-04-25 DIAGNOSIS — Z85.3 PERSONAL HISTORY OF MALIGNANT NEOPLASM OF BREAST: ICD-10-CM

## 2024-04-25 DIAGNOSIS — Z90.13 ACQUIRED ABSENCE OF BILATERAL BREASTS AND NIPPLES: ICD-10-CM

## 2024-04-25 DIAGNOSIS — H02.833 DERMATOCHALASIS OF RIGHT EYE, UNSPECIFIED EYELID: ICD-10-CM

## 2024-04-25 DIAGNOSIS — Z98.891 HISTORY OF UTERINE SCAR FROM PREVIOUS SURGERY: Chronic | ICD-10-CM

## 2024-04-25 DIAGNOSIS — Z98.890 OTHER SPECIFIED POSTPROCEDURAL STATES: Chronic | ICD-10-CM

## 2024-04-25 DIAGNOSIS — Z90.13 ACQUIRED ABSENCE OF BILATERAL BREASTS AND NIPPLES: Chronic | ICD-10-CM

## 2024-04-25 DIAGNOSIS — N65.0 DEFORMITY OF RECONSTRUCTED BREAST: ICD-10-CM

## 2024-04-25 PROCEDURE — 88331 PATH CONSLTJ SURG 1 BLK 1SPC: CPT | Mod: 26

## 2024-04-25 PROCEDURE — 15823 BLEPHARP UPR EYELID XCSV SKN: CPT | Mod: 50

## 2024-04-25 PROCEDURE — 88332 PATH CONSLTJ SURG EA ADD BLK: CPT

## 2024-04-25 PROCEDURE — 19380 REVJ RECONSTRUCTED BREAST: CPT | Mod: LT

## 2024-04-25 PROCEDURE — 88332 PATH CONSLTJ SURG EA ADD BLK: CPT | Mod: 26

## 2024-04-25 RX ORDER — HYDROMORPHONE HYDROCHLORIDE 2 MG/ML
1 INJECTION INTRAMUSCULAR; INTRAVENOUS; SUBCUTANEOUS
Refills: 0 | Status: DISCONTINUED | OUTPATIENT
Start: 2024-04-25 | End: 2024-04-25

## 2024-04-25 RX ORDER — SCOPALAMINE 1 MG/3D
0 PATCH, EXTENDED RELEASE TRANSDERMAL
Refills: 0 | DISCHARGE

## 2024-04-25 RX ORDER — SODIUM CHLORIDE 9 MG/ML
1000 INJECTION, SOLUTION INTRAVENOUS
Refills: 0 | Status: DISCONTINUED | OUTPATIENT
Start: 2024-04-25 | End: 2024-04-25

## 2024-04-25 RX ORDER — OXYCODONE HYDROCHLORIDE 5 MG/1
5 TABLET ORAL ONCE
Refills: 0 | Status: DISCONTINUED | OUTPATIENT
Start: 2024-04-25 | End: 2024-04-25

## 2024-04-25 RX ORDER — HYDROMORPHONE HYDROCHLORIDE 2 MG/ML
0.5 INJECTION INTRAMUSCULAR; INTRAVENOUS; SUBCUTANEOUS
Refills: 0 | Status: DISCONTINUED | OUTPATIENT
Start: 2024-04-25 | End: 2024-04-25

## 2024-04-25 RX ORDER — ONDANSETRON 8 MG/1
4 TABLET, FILM COATED ORAL ONCE
Refills: 0 | Status: DISCONTINUED | OUTPATIENT
Start: 2024-04-25 | End: 2024-04-25

## 2024-04-25 NOTE — ASU PATIENT PROFILE, ADULT - FALL HARM RISK - UNIVERSAL INTERVENTIONS
Bed in lowest position, wheels locked, appropriate side rails in place/Call bell, personal items and telephone in reach/Instruct patient to call for assistance before getting out of bed or chair/Non-slip footwear when patient is out of bed/[place] to call system/Physically safe environment - no spills, clutter or unnecessary equipment/Purposeful Proactive Rounding/Room/bathroom lighting operational, light cord in reach

## 2024-04-25 NOTE — ASU PREOP CHECKLIST - 1.
has Scopalamine patch behind right ear; placed 4/24/24 @1508 has Scopalamine patch behind right ear; placed 4/24/24 @2230; hx nausea with Anesthesia

## 2024-04-25 NOTE — ASU PATIENT PROFILE, ADULT - BLOOD TRANSFUSION, PREVIOUS, PROFILE
HENT:   Head: Normocephalic and atraumatic. Right Ear: Tympanic membrane, external ear and ear canal normal.   Left Ear: Tympanic membrane, external ear and ear canal normal.   Nose: Nose normal.   Mouth/Throat: Uvula is midline, oropharynx is clear and moist and mucous membranes are normal.   Neck: Normal range of motion. Neck supple. Cardiovascular: Normal rate, regular rhythm and normal heart sounds. No murmur heard. Pulmonary/Chest: Effort normal and breath sounds normal. He has no wheezes. Musculoskeletal:        Right shoulder: He exhibits tenderness and pain. He exhibits normal range of motion. Lymphadenopathy:     He has no cervical adenopathy. Skin: Skin is warm and dry. No rash noted. Lab Results   Component Value Date    LABA1C 5.5 02/26/2018       Assessment & Plan   1. Essential hypertension     2. Pure hypercholesterolemia     3. Type 2 diabetes mellitus without complication, without long-term current use of insulin ([facility] [address]     4. Leukopenia, unspecified type     5. Right shoulder pain, unspecified chronicity  XR SHOULDER RIGHT (MIN 2 VIEWS)    Referral To Unknown External Orthopedic Surgery   6. Alcohol consumption of one to four drinks per day on alcohol screening     7. Actinic keratoses  [address] - ME DESTRUC PREMALIGNANT,2-14 LESIONS     I reviewed his labs with him which were normal except low WBC. He will retest WBC end of next month. He will f/u with ortho for right shoulder. BP and DM controlled so continue same for now. LN2 times 3 seconds to affected areas times 6 lesion(s). Informed consent given was given. Risks including infection, bleeding, scarring discussed. No guarantee how scars will look. Post op instructions given. Best to leave blisters alone if they form. If blister(s) pop or patient pops with a sterilized needed, apply antibiotic ointment and a bandage to affected area(s).      Orders Placed This Encounter   Procedures    XR SHOULDER RIGHT (MIN 2 VIEWS) no Unremarkable

## 2024-04-25 NOTE — ASU DISCHARGE PLAN (ADULT/PEDIATRIC) - ASU DC SPECIAL INSTRUCTIONSFT
NOTIFY YOUR SURGEON IF YOU HAVE: any bleeding that does not stop, any pus draining from your wound(s), any fever (over 100.4 F) persistent nausea/vomiting, or if your pain is not controlled on your discharge pain medications, unable to urinate.    ACTIVITY: Please refrain from increased physical activity for a period of 2 weeks. Please do not lift anything heavier than a gallon of milk or about 5 pounds. Upon follow up you will be given further instructions on how much physical activity you may do.     MEDICATIONS: please take any prescribed medications from Dr. Evans's office as directed.     Dressing: please leave dressing in place until follow up. Please keep the dressing clean and dry while you recover.     EYES: Please do not remove the small surgical tapes on the side of your eyes. Please apply eye drops as needed to either eye. Twice daily please apply eye lubricating ointment.     Please follow up with Dr. Evans within 1 week after discharge from the hospital. You may call [phone] to schedule an appointment.

## 2024-04-25 NOTE — ASU DISCHARGE PLAN (ADULT/PEDIATRIC) - CARE PROVIDER_API CALL
Zach Evans  Plastic Surgery  [address], [address]  [place] [address]  Phone: [phone]  Fax: [phone]  Follow Up Time:

## 2024-04-25 NOTE — ASU PATIENT PROFILE, ADULT - NSICDXPASTSURGICALHX_GEN_ALL_CORE_FT
PAST SURGICAL HISTORY:  History of bilateral breast reduction surgery     History of bilateral mastectomy with DAVE and expanders 2021    History of  delivery ,     History of dilatation and curettage [place] [id number]

## 2024-04-25 NOTE — BRIEF OPERATIVE NOTE - NSICDXBRIEFPROCEDURE_GEN_ALL_CORE_FT
PROCEDURES:  Revision of reconstructed breast 25-Apr-2024 16:00:46  Raman Muir  Blepharoplasty, upper eyelid, with excess skin removal 25-Apr-2024 16:01:23 bilateral Raman Muir

## 2024-04-25 NOTE — BRIEF OPERATIVE NOTE - OPERATION/FINDINGS
left breast revision with capsulotomy and plication with replacement of implant using the existing implant. bilateral simple upper blepharoplasties.

## 2024-04-25 NOTE — ASU PATIENT PROFILE, ADULT - NSICDXPASTMEDICALHX_GEN_ALL_CORE_FT
PAST MEDICAL HISTORY:  Personal history of breast cancer left. b/l mastectomy 7/2021. Wound to left breast. Daily packing.    
Skin normal color for race, warm, dry and intact. No evidence of rash. + abrasions to forehead.

## 2024-05-01 LAB — SURGICAL PATHOLOGY STUDY: SIGNIFICANT CHANGE UP

## 2024-08-27 ENCOUNTER — NON-APPOINTMENT (OUTPATIENT)
Age: 54
End: 2024-08-27

## 2024-08-29 ENCOUNTER — NON-APPOINTMENT (OUTPATIENT)
Age: 54
End: 2024-08-29

## 2024-09-24 ENCOUNTER — APPOINTMENT (OUTPATIENT)
Dept: OPHTHALMOLOGY | Facility: CLINIC | Age: 54
End: 2024-09-24

## 2025-04-22 NOTE — H&P PST ADULT - VENOUS THROMBOEMBOLISM
Carlos Bashir Jr presents today for   Chief Complaint   Patient presents with    Follow-up     3 month    Cardiac Clearance     Left shoulder arthroscopic labral debridement with Dr. Bernstein at [facility] date to be determined        Carlos Bashir Jr preferred language for health care discussion is english/other.    Is someone accompanying this pt? no    Is the patient using any DME equipment during OV? no    Depression Screening:  Depression: Not at risk (4/22/2025)    PHQ-2     PHQ-2 Score: 0        Learning Assessment:  Who is the primary learner? Patient    What is the preferred language for health care of the primary learner? ENGLISH    How does the primary learner prefer to learn new concepts? DEMONSTRATION    Answered By patient    Relationship to Learner SELF           Pt currently taking Anticoagulant therapy? no    Pt currently taking Antiplatelet therapy ? no      Coordination of Care:  1. Have you been to the ER, urgent care clinic since your last visit? Hospitalized since your last visit? no    2. Have you seen or consulted any other health care providers outside of the [facility] since your last visit? Include any pap smears or colon screening. no     no

## (undated) DEVICE — PACK MINOR

## (undated) DEVICE — SYR LUER LOK 10CC

## (undated) DEVICE — SOL IRR POUR NS 0.9% 500ML

## (undated) DEVICE — BRA PINK MED 33-36"

## (undated) DEVICE — SUT PLAIN GUT FAST ABSORBING 5-0 PC-1

## (undated) DEVICE — ELCTR BOVIE TIP BLADE INSULATED 2.75" EDGE

## (undated) DEVICE — BLADE SCALPEL SAFETYLOCK #15

## (undated) DEVICE — BRA PINK SM 30-33"

## (undated) DEVICE — SUT MONOCRYL 5-0 18" P-3 UNDYED

## (undated) DEVICE — DRAPE 1/2 SHEET 40X57"

## (undated) DEVICE — SOL IRR POUR H2O 1500ML

## (undated) DEVICE — DRSG XEROFORM 2 X 2"

## (undated) DEVICE — VENODYNE/SCD SLEEVE CALF LARGE

## (undated) DEVICE — SUT MONOCRYL 4-0 27" PS-2 UNDYED

## (undated) DEVICE — WARMING BLANKET LOWER ADULT

## (undated) DEVICE — POSITIONER STRAP ARMBOARD VELCRO TS-30

## (undated) DEVICE — POSITIONER FOAM HEAD CRADLE (PINK)

## (undated) DEVICE — DRAPE SPLIT SHEET 77" X 108"

## (undated) DEVICE — SOLIDIFIER CANN EXPRESS 3K

## (undated) DEVICE — STAPLER SKIN VISI-STAT 35 WIDE